# Patient Record
Sex: MALE | Employment: FULL TIME | URBAN - METROPOLITAN AREA
[De-identification: names, ages, dates, MRNs, and addresses within clinical notes are randomized per-mention and may not be internally consistent; named-entity substitution may affect disease eponyms.]

---

## 2019-09-03 ENCOUNTER — OFFICE VISIT (OUTPATIENT)
Dept: HEMATOLOGY | Age: 39
End: 2019-09-03

## 2019-09-03 VITALS
TEMPERATURE: 97.7 F | DIASTOLIC BLOOD PRESSURE: 90 MMHG | HEART RATE: 68 BPM | SYSTOLIC BLOOD PRESSURE: 133 MMHG | WEIGHT: 174.8 LBS | HEIGHT: 70 IN | BODY MASS INDEX: 25.03 KG/M2 | OXYGEN SATURATION: 99 %

## 2019-09-03 DIAGNOSIS — K76.89 LIVER CYST: Primary | ICD-10-CM

## 2019-09-03 PROBLEM — D18.03 LIVER HEMANGIOMA: Status: ACTIVE | Noted: 2019-09-03

## 2019-09-03 RX ORDER — BISMUTH SUBSALICYLATE 262 MG
1 TABLET,CHEWABLE ORAL DAILY
COMMUNITY

## 2019-09-03 RX ORDER — ASCORBIC ACID 250 MG
TABLET ORAL
COMMUNITY

## 2019-09-03 NOTE — Clinical Note
9/3/19 Patient: Anya Driver YOB: 1980 Date of Visit: 9/3/2019 Frances Hunt NP 
[facility] 7 [address] VIA Facsimile: [phone] Dear Frances Hunt NP, [facility]. My notes for this consultation are attached. If you have questions, please do not hesitate to call me. I look forward to following your patient along with you. Sincerely, Sera Angeles MD

## 2019-09-03 NOTE — PROGRESS NOTES
[facility] hospitals, Corie LOPEZ Denzil Nares, MD Alonna Pollen, NONA Carr, [facility]     Whitney MELENDEZ Sumit, [facility]   NEVILLE Garcia, [facility]       Mahad Carrion [facility] 136    at [address], [facility] Pankaj Pinto  22.    [phone]    FAX: [address] [place] [address], [address]    [phone]    FAX: [phone]         Patient Care Team:  Jessie Russell NP as PCP - General (Nurse Practitioner)      Problem List  Date Reviewed: 9/3/2019          Codes Class Noted    Liver cyst ICD-10-CM: K76.89  ICD-9-CM: 573.8  9/3/2019        Liver hemangioma ICD-10-CM: D18.03  ICD-9-CM: 228.04  9/3/2019                The clinicians listed above have asked me to see Valente Lawton in consultation regarding a liver mass. All medical records sent by the referring physicians were reviewed including imaging studies     The patient is a 46 y.o.  male without any history of previous liver disease. The patient underwent an ultrasound for evaluation of right upper quadrant abdominal pain which is exacerbated by sleeping on stomach in 8/2016. This demonstrated a single hyperechoic mass measuring 1.1 x 0.8 cm in the right lobe and a anechoic cyst measuring 7.0 -4.6 cms in the right lobe of the liver. The patient had not been taking supplements. Imaging studies of the liver suggest a normal liver in addition to the masses.     The patient has pain in the right side over the liver but only when lying on stomach,     The patient has not experienced the following symptoms:  fatigue,     The patient completes all daily activities without any functional limitations. ASSESSMENT AND PLAN:  Hemangioma  The smaller of the 2 liver masses is echogenic by ultrasound and characteristic for hemangioma. The size is 1 cm by ultrasound. Hemangiomas are benign and in general do not increase in size over time. Will confirm the diagnosis with triple phase CT scan. Repeated imaging to monitor the size of these lesions is not necessary once the diagnosis has been confirmed. Liver Cyst  The patient has a single medium sized cyst in the right lobe. Depending upon its location this may be causing his pain when he sleeps on his stomach. Will perform triple phase CT to evalute the cyst     Serologic testing for HAV, HBV and HCV were ordered because of his risk factor. Liver cysts are common and benign over 99% of the time. In general no treatment is necessary. Large cysts can sometimes cause pain but most are asymptomatic and found because an imaging study was done for pain from another etiology. Large cysts that do cause pain can be treated with either aspiration or surgery. Will perform laboratory testing to monitor liver function and degree of liver injury. This included BMP, hepatic panel, CBC with platelet count, INR. Will measure the following serologic cancer markers AFP. CA 19-9. CEA. Treatment of other medical problems in patients with chronic liver disease  There are no contraindications for the patient to take most medications that are necessary for treatment of other medical issues. Counseling for alcohol in patients with chronic liver disease  The patient was counseled regarding alcohol consumption and the effect of alcohol on chronic liver disease. The patient does not consume any significant amount of alcohol. Vaccinations   The need for vaccination against viral hepatitis A and B will be assessed with serologic and instituted as appropriate.   Routine vaccinations against other bacterial and viral agents can be performed as indicated. Annual flu vaccination should be administered if indicated. ALLERGIES  No Known Allergies    MEDICATIONS  Current Outpatient Medications   Medication Sig    emtricitabine/tenofovir, TDF, (TRUVADA PO) Take  by mouth.  multivitamin (ONE A DAY) tablet Take 1 Tab by mouth daily.  B.infantis-B.ani-B.long-B.bifi (PROBIOTIC 4X) 10-15 mg TbEC Take  by mouth.  ascorbic acid, vitamin C, (VITAMIN C) 250 mg tablet Take  by mouth. No current facility-administered medications for this visit. SYSTEM REVIEW NOT RELATED TO LIVER DISEASE OR REVIEWED ABOVE:  Constitution systems: Negative for fever, chills, weight gain, weight loss. Eyes: Negative for visual changes. ENT: Negative for sore throat, painful swallowing. Respiratory: Negative for cough, hemoptysis, SOB. Cardiology: Negative for chest pain, palpitations. GI:  Negative for constipation or diarrhea. : Negative for urinary frequency, dysuria, hematuria, nocturia. Skin: Negative for rash. Hematology: Negative for easy bruising, blood clots. Musculo-skelatal: Negative for back pain, muscle pain, weakness. Neurologic: Negative for headaches, dizziness, vertigo, memory problems not related to HE. Psychology: Negative for anxiety, depression. FAMILY HISTORY:  The father Has/had the following following chronic disease(s): None. The mother Has/had the following chronic disease(s): DM, HTN. There is no family history of liver disease. SOCIAL HISTORY:  The patient has never been . The patient has no children. The patient has never used tobacco products. The patient consumes alcohol on social occasions never in excess. 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         PHYSICAL EXAMINATION:  Visit Vitals  /90 (BP 1 Location: Left arm, BP Patient Position: Sitting)   Pulse 68   Temp 97.7 °F (36.5 °C) (Tympanic)   Ht 5' 10\" (1.778 m)   Wt 174 lb 12.8 oz (79.3 kg)   SpO2 99%   BMI 25.08 kg/m²     General: No acute distress. Eyes: Sclera anicteric. ENT: No oral lesions. Thyroid normal.  Nodes: No adenopathy. Skin: No spider angiomata. No jaundice. No palmar erythema. Respiratory: Lungs clear to auscultation. Cardiovascular: Regular heart rate. No murmurs. No JVD. Abdomen: Soft non-tender. Liver size normal to percussion/palpation. Spleen not palpable. No obvious ascites. Extremities: No edema. No muscle wasting. No gross arthritic changes. Neurologic: Alert and oriented. Cranial nerves grossly intact. No asterixis. LABORATORY STUDIES:  From 8/2019  AST/ALT/ALP/T Bili/ALB:  21/20/86/0.4/4.6  WBC/HB/PLT/INR:  8.1/15.6/267  BUN/CREAT:  21/1.21    SEROLOGIES:  Not available or performed. Testing was performed today. LIVER HISTOLOGY:  Not available or performed    ENDOSCOPIC PROCEDURES:  Not available or performed    RADIOLOGY:  8/2019. Ultrasound of liver. Normal appearing liver. Single hyperechoic mass in the right lobe measuring 1  cm consistent with hemangioma and a single anecholic mass in the right lobe measuring 7.0 x 4.6 cm consistent with cyst.    OTHER TESTING:  Not available or performed    FOLLOW-UP:  All of the issues listed above in the Assessment and Plan were discussed with the patient. All questions were answered. The patient expressed a clear understanding of the above. [facility] 87 in 4 weeks which should be 1-2 weeks after the next imaging study.         Olu Larios MD  [facility] 13 [address], [address] [place] [address] 22.  [phone]  [address]

## 2019-09-03 NOTE — PROGRESS NOTES
Chief Complaint   Patient presents with   [facility] Patient     Visit Vitals  /90 (BP 1 Location: Left arm, BP Patient Position: Sitting)   Pulse 68   Temp 97.7 °F (36.5 °C) (Tympanic)   Ht 5' 10\" (1.778 m)   Wt 174 lb 12.8 oz (79.3 kg)   SpO2 99%   BMI 25.08 kg/m²         3 most recent PHQ Screens 9/3/2019   Little interest or pleasure in doing things Not at all   Feeling down, depressed, irritable, or hopeless Not at all   Total Score PHQ 2 0     Abuse Screening Questionnaire 9/3/2019   Do you ever feel afraid of your partner? N   Are you in a relationship with someone who physically or mentally threatens you? N   Is it safe for you to go home?  Y     Learning Assessment 9/3/2019   PRIMARY LEARNER Patient   HIGHEST LEVEL OF EDUCATION - PRIMARY LEARNER  > 4 YEARS OF COLLEGE   BARRIERS PRIMARY LEARNER NONE   PRIMARY LANGUAGE ENGLISH   LEARNER PREFERENCE PRIMARY LISTENING   ANSWERED BY patient    RELATIONSHIP SELF

## 2019-09-04 LAB
AFP-TM SERPL-MCNC: 3.1 NG/ML (ref 0–8.3)
ALBUMIN SERPL-MCNC: 5.2 G/DL (ref 3.5–5.5)
ALP SERPL-CCNC: 91 IU/L (ref 39–117)
ALT SERPL-CCNC: 27 IU/L (ref 0–44)
AST SERPL-CCNC: 29 IU/L (ref 0–40)
BASOPHILS # BLD AUTO: 0 X10E3/UL (ref 0–0.2)
BASOPHILS NFR BLD AUTO: 0 %
BILIRUB DIRECT SERPL-MCNC: 0.12 MG/DL (ref 0–0.4)
BILIRUB SERPL-MCNC: 0.4 MG/DL (ref 0–1.2)
BUN SERPL-MCNC: 12 MG/DL (ref 6–20)
BUN/CREAT SERPL: 14 (ref 9–20)
CALCIUM SERPL-MCNC: 10.2 MG/DL (ref 8.7–10.2)
CANCER AG19-9 SERPL-ACNC: 8 U/ML (ref 0–35)
CEA SERPL-MCNC: 1.7 NG/ML (ref 0–4.7)
CHLORIDE SERPL-SCNC: 98 MMOL/L (ref 96–106)
CO2 SERPL-SCNC: 27 MMOL/L (ref 20–29)
CREAT SERPL-MCNC: 0.88 MG/DL (ref 0.76–1.27)
EOSINOPHIL # BLD AUTO: 0.2 X10E3/UL (ref 0–0.4)
EOSINOPHIL NFR BLD AUTO: 3 %
ERYTHROCYTE [DISTWIDTH] IN BLOOD BY AUTOMATED COUNT: 12.5 % (ref 12.3–15.4)
GLUCOSE SERPL-MCNC: 85 MG/DL (ref 65–99)
HAV AB SER QL IA: NEGATIVE
HBV CORE AB SERPL QL IA: NEGATIVE
HBV SURFACE AB SER QL: REACTIVE
HBV SURFACE AG SERPL QL IA: NEGATIVE
HCT VFR BLD AUTO: 49.2 % (ref 37.5–51)
HGB BLD-MCNC: 16.1 G/DL (ref 13–17.7)
IMM GRANULOCYTES # BLD AUTO: 0 X10E3/UL (ref 0–0.1)
IMM GRANULOCYTES NFR BLD AUTO: 0 %
INR PPP: 1 (ref 0.8–1.2)
LYMPHOCYTES # BLD AUTO: 1.9 X10E3/UL (ref 0.7–3.1)
LYMPHOCYTES NFR BLD AUTO: 26 %
MCH RBC QN AUTO: 30.8 PG (ref 26.6–33)
MCHC RBC AUTO-ENTMCNC: 32.7 G/DL (ref 31.5–35.7)
MCV RBC AUTO: 94 FL (ref 79–97)
MONOCYTES # BLD AUTO: 0.5 X10E3/UL (ref 0.1–0.9)
MONOCYTES NFR BLD AUTO: 6 %
NEUTROPHILS # BLD AUTO: 4.8 X10E3/UL (ref 1.4–7)
NEUTROPHILS NFR BLD AUTO: 65 %
PLATELET # BLD AUTO: 276 X10E3/UL (ref 150–450)
POTASSIUM SERPL-SCNC: 4.5 MMOL/L (ref 3.5–5.2)
PROT SERPL-MCNC: 7.7 G/DL (ref 6–8.5)
PROTHROMBIN TIME: 10.4 SEC (ref 9.1–12)
RBC # BLD AUTO: 5.22 X10E6/UL (ref 4.14–5.8)
SODIUM SERPL-SCNC: 141 MMOL/L (ref 134–144)
WBC # BLD AUTO: 7.4 X10E3/UL (ref 3.4–10.8)

## 2019-09-13 ENCOUNTER — HOSPITAL ENCOUNTER (OUTPATIENT)
Dept: CT IMAGING | Age: 39
Discharge: HOME | End: 2019-09-13
Attending: INTERNAL MEDICINE
Payer: COMMERCIAL

## 2019-09-13 DIAGNOSIS — K76.89 LIVER CYST: ICD-10-CM

## 2019-09-13 PROCEDURE — 74170 CT ABD WO CNTRST FLWD CNTRST: CPT

## 2019-09-13 PROCEDURE — 74011636320 HC RX REV CODE- 636/320: Performed by: RADIOLOGY

## 2019-09-13 PROCEDURE — 74011000258 HC RX REV CODE- 258: Performed by: RADIOLOGY

## 2019-09-13 RX ORDER — SODIUM CHLORIDE 0.9 % (FLUSH) 0.9 %
10 SYRINGE (ML) INJECTION
Status: COMPLETED | OUTPATIENT
Start: 2019-09-13 | End: 2019-09-13

## 2019-09-13 RX ADMIN — SODIUM CHLORIDE 100 ML: 900 INJECTION, SOLUTION INTRAVENOUS at 08:07

## 2019-09-13 RX ADMIN — Medication 10 ML: at 08:07

## 2019-09-13 RX ADMIN — IOPAMIDOL 100 ML: 755 INJECTION, SOLUTION INTRAVENOUS at 08:07

## 2019-10-03 ENCOUNTER — OFFICE VISIT (OUTPATIENT)
Dept: HEMATOLOGY | Age: 39
End: 2019-10-03

## 2019-10-03 VITALS
BODY MASS INDEX: 24.82 KG/M2 | DIASTOLIC BLOOD PRESSURE: 69 MMHG | HEART RATE: 72 BPM | OXYGEN SATURATION: 97 % | TEMPERATURE: 96.4 F | SYSTOLIC BLOOD PRESSURE: 118 MMHG | WEIGHT: 173 LBS

## 2019-10-03 DIAGNOSIS — K76.89 LIVER CYST: Primary | ICD-10-CM

## 2019-10-03 PROBLEM — D18.03 LIVER HEMANGIOMA: Status: RESOLVED | Noted: 2019-09-03 | Resolved: 2019-10-03

## 2019-10-03 NOTE — PROGRESS NOTES
Chief Complaint   Patient presents with    Follow-up     3 most recent PHQ Screens 9/3/2019   Little interest or pleasure in doing things Not at all   Feeling down, depressed, irritable, or hopeless Not at all   Total Score PHQ 2 0     Abuse Screening Questionnaire 9/3/2019   Do you ever feel afraid of your partner? N   Are you in a relationship with someone who physically or mentally threatens you? N   Is it safe for you to go home?  Y     Learning Assessment 9/3/2019   PRIMARY LEARNER Patient   HIGHEST LEVEL OF EDUCATION - PRIMARY LEARNER  > 4 YEARS OF COLLEGE   BARRIERS PRIMARY LEARNER NONE   PRIMARY LANGUAGE ENGLISH   LEARNER PREFERENCE PRIMARY LISTENING   ANSWERED BY patient    RELATIONSHIP SELF       Visit Vitals  /69 (BP 1 Location: Right arm, BP Patient Position: Sitting)   Pulse 72   Temp 96.4 °F (35.8 °C) (Tympanic)   Wt 173 lb (78.5 kg)   SpO2 97%   BMI 24.82 kg/m²

## 2019-10-03 NOTE — PROGRESS NOTES
[facility], Amy LOPEZ Sophie Logan, MD Celina Mellow, NONA Wilcox, [facility]     Whitney Arana, [facility]   Nenita OSCAR [facility]    Orlando Bhagatrid, [facility]       Mahadmercy Carrion [facility] 136    at [address], [address] Pankaj Pinto  22.    [phone]    FAX: [address] [place] [address], [address]    [phone]    FAX: [phone]       Patient Care Team:  Davie Rose NP as PCP - General (Nurse Practitioner)      Problem List  Date Reviewed: 9/3/2019          Codes Class Noted    Liver cyst ICD-10-CM: K76.89  ICD-9-CM: 573.8  9/3/2019                Shanique Quiroz returns to the [address] for management of a single Liver cyst.  The active problem list, all pertinent past medical history, medications, radiologic findings and laboratory findings related to the liver disorder were reviewed with the patient. The patient is a 46 y.o.  male without any history of previous liver disease. The patient underwent an ultrasound for evaluation of right upper quadrant abdominal pain which is exacerbated by sleeping on stomach in 8/2016. This demonstrated a single hyperechoic mass measuring 1.1 x 0.8 cm in the right lobe and a anechoic cyst measuring 7.0 -4.6 cms in the right lobe of the liver. Triple phase CT in 9/2019 demonstrated the hyperechoic lesion was likely to be a a small vascular shunt and not true hemangioma. The patient had not been taking supplements. Imaging studies of the liver suggest a normal liver in addition to the masses.     The patient has pain in the right side over the liver but only when lying on stomach,     The patient has not experienced the following symptoms:  fatigue,     The patient completes all daily activities without any functional limitations. ASSESSMENT AND PLAN:  Liver Cyst  The patient has a single medium sized cyst 7 x 4.5 cm in the right lobe. Liver cysts are common and benign over 99.9% of the time. Cysts do not generally grow and can grow slowly over decades. No treatment is necessary. No repeated imaging is necessary. Vascular Shunt  The lesion that was thought to be a small hemangioma in the left lobe is only a vascular shunt. This has no clinical significance. Treatment of other medical problems in patients with chronic liver disease  There are no contraindications for the patient to take most medications that are necessary for treatment of other medical issues. Counseling for alcohol in patients with chronic liver disease  The patient was counseled regarding alcohol consumption and the effect of alcohol on chronic liver disease. The patient does not consume any significant amount of alcohol. Vaccinations   The need for vaccination against viral hepatitis A and B will be assessed with serologic and instituted as appropriate. Routine vaccinations against other bacterial and viral agents can be performed as indicated. Annual flu vaccination should be administered if indicated. ALLERGIES  Allergies   Allergen Reactions    Bee Venom Protein (Honey Bee) Hives     Massive sweeling       MEDICATIONS  Current Outpatient Medications   Medication Sig    ibuprofen (ADVIL) 100 mg tablet Take 100 mg by mouth every six (6) hours as needed for Pain.  multivitamin (ONE A DAY) tablet Take 1 Tab by mouth daily.  B.infantis-B.ani-B.long-B.bifi (PROBIOTIC 4X) 10-15 mg TbEC Take  by mouth.  ascorbic acid, vitamin C, (VITAMIN C) 250 mg tablet Take  by mouth.  emtricitabine/tenofovir, TDF, (TRUVADA PO) Take  by mouth. No current facility-administered medications for this visit. SYSTEM REVIEW NOT RELATED TO LIVER DISEASE OR REVIEWED ABOVE:  Constitution systems: Negative for fever, chills, weight gain, weight loss. Eyes: Negative for visual changes. ENT: Negative for sore throat, painful swallowing. Respiratory: Negative for cough, hemoptysis, SOB. Cardiology: Negative for chest pain, palpitations. GI:  Negative for constipation or diarrhea. : Negative for urinary frequency, dysuria, hematuria, nocturia. Skin: Negative for rash. Hematology: Negative for easy bruising, blood clots. Musculo-skelatal: Negative for back pain, muscle pain, weakness. Neurologic: Negative for headaches, dizziness, vertigo, memory problems not related to HE. Psychology: Negative for anxiety, depression. FAMILY HISTORY:  The father Has/had the following following chronic disease(s): None. The mother Has/had the following chronic disease(s): DM, HTN. There is no family history of liver disease. SOCIAL HISTORY:  The patient has never been . The patient has no children. The patient has never used tobacco products. The patient consumes alcohol on social occasions never in excess. 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 PHYSICAL EXAMINATION:  Visit Vitals  /69 (BP 1 Location: Right arm, BP Patient Position: Sitting)   Pulse 72   Temp 96.4 °F (35.8 °C) (Tympanic)   Wt 173 lb (78.5 kg)   SpO2 97%   BMI 24.82 kg/m²     General: No acute distress. Eyes: Sclera anicteric. ENT: No oral lesions. Thyroid normal.  Nodes: No adenopathy. Skin: No spider angiomata. No jaundice. No palmar erythema. Respiratory: Lungs clear to auscultation. Cardiovascular: Regular heart rate. No murmurs. No JVD. Abdomen: Soft non-tender. Liver size normal to percussion/palpation. Spleen not palpable. No obvious ascites. Extremities: No edema. No muscle wasting. No gross arthritic changes. Neurologic: Alert and oriented.   Cranial nerves grossly intact. No asterixis. LABORATORY STUDIES:  From 8/2019  AST/ALT/ALP/T Bili/ALB:  21/20/86/0.4/4.6  WBC/HB/PLT/INR:  8.1/15.6/267  BUN/CREAT:  21/1.21    SEROLOGIES:  Not available or performed. Testing was performed today. LIVER HISTOLOGY:  Not available or performed    ENDOSCOPIC PROCEDURES:  Not available or performed    RADIOLOGY:  8/2019. Ultrasound of liver. Normal appearing liver. Single hyperechoic mass in the right lobe measuring 1  cm consistent with hemangioma and a single anecholic mass in the right lobe measuring 7.0 x 4.6 cm consistent with cyst.    OTHER TESTING:  Not available or performed    FOLLOW-UP:  All of the issues listed above in the Assessment and Plan were discussed with the patient. All questions were answered. The patient expressed a clear understanding of the above. No follow-up at [address] is needed. I would be glad to see the patient back for follow-up at any time in the future if the clinical situation changes.       MD Terrell Brown [address], [address] [place] [address] 22.  [phone]  [address] [place] [address]

## 2019-10-03 NOTE — LETTER
10/3/19 Patient: Linsey Harper YOB: 1980 Date of Visit: 10/3/2019 Yamila Mtz NP 
[place] [address] VIA Facsimile: [phone] Dear Yamila Mtz NP, Thank you for referring Mr. Linsey Harper to [address] [place] [address],[address] for evaluation. My notes for this consultation are attached. If you have questions, please do not hesitate to call me. I look forward to following your patient along with you. Sincerely, David Gray MD